# Patient Record
Sex: MALE | Race: WHITE
[De-identification: names, ages, dates, MRNs, and addresses within clinical notes are randomized per-mention and may not be internally consistent; named-entity substitution may affect disease eponyms.]

---

## 2018-11-13 ENCOUNTER — HOSPITAL ENCOUNTER (EMERGENCY)
Dept: HOSPITAL 47 - EC | Age: 28
Discharge: HOME | End: 2018-11-13
Payer: COMMERCIAL

## 2018-11-13 VITALS
HEART RATE: 77 BPM | RESPIRATION RATE: 18 BRPM | TEMPERATURE: 97.3 F | DIASTOLIC BLOOD PRESSURE: 73 MMHG | SYSTOLIC BLOOD PRESSURE: 139 MMHG

## 2018-11-13 DIAGNOSIS — N20.2: Primary | ICD-10-CM

## 2018-11-13 DIAGNOSIS — Z87.891: ICD-10-CM

## 2018-11-13 LAB
ALBUMIN SERPL-MCNC: 4.5 G/DL (ref 3.5–5)
ALP SERPL-CCNC: 67 U/L (ref 38–126)
ALT SERPL-CCNC: 39 U/L (ref 21–72)
ANION GAP SERPL CALC-SCNC: 11 MMOL/L
AST SERPL-CCNC: 35 U/L (ref 17–59)
BASOPHILS # BLD AUTO: 0 K/UL (ref 0–0.2)
BASOPHILS NFR BLD AUTO: 0 %
BUN SERPL-SCNC: 13 MG/DL (ref 9–20)
CALCIUM SPEC-MCNC: 9.5 MG/DL (ref 8.4–10.2)
CHLORIDE SERPL-SCNC: 105 MMOL/L (ref 98–107)
CO2 SERPL-SCNC: 24 MMOL/L (ref 22–30)
EOSINOPHIL # BLD AUTO: 0.1 K/UL (ref 0–0.7)
EOSINOPHIL NFR BLD AUTO: 1 %
ERYTHROCYTE [DISTWIDTH] IN BLOOD BY AUTOMATED COUNT: 5.42 M/UL (ref 4.3–5.9)
ERYTHROCYTE [DISTWIDTH] IN BLOOD: 13 % (ref 11.5–15.5)
GLUCOSE SERPL-MCNC: 92 MG/DL (ref 74–99)
HCT VFR BLD AUTO: 46.1 % (ref 39–53)
HGB BLD-MCNC: 15.5 GM/DL (ref 13–17.5)
LYMPHOCYTES # SPEC AUTO: 1.2 K/UL (ref 1–4.8)
LYMPHOCYTES NFR SPEC AUTO: 7 %
MCH RBC QN AUTO: 28.5 PG (ref 25–35)
MCHC RBC AUTO-ENTMCNC: 33.5 G/DL (ref 31–37)
MCV RBC AUTO: 85.1 FL (ref 80–100)
MONOCYTES # BLD AUTO: 0.8 K/UL (ref 0–1)
MONOCYTES NFR BLD AUTO: 5 %
NEUTROPHILS # BLD AUTO: 14.1 K/UL (ref 1.3–7.7)
NEUTROPHILS NFR BLD AUTO: 86 %
PH UR: 5.5 [PH] (ref 5–8)
PLATELET # BLD AUTO: 222 K/UL (ref 150–450)
POTASSIUM SERPL-SCNC: 4.6 MMOL/L (ref 3.5–5.1)
PROT SERPL-MCNC: 8 G/DL (ref 6.3–8.2)
RBC UR QL: >182 /HPF (ref 0–5)
SODIUM SERPL-SCNC: 140 MMOL/L (ref 137–145)
SP GR UR: 1.02 (ref 1–1.03)
UROBILINOGEN UR QL STRIP: <2 MG/DL (ref ?–2)
WBC # BLD AUTO: 16.4 K/UL (ref 3.8–10.6)
WBC #/AREA URNS HPF: 2 /HPF (ref 0–5)

## 2018-11-13 PROCEDURE — 36415 COLL VENOUS BLD VENIPUNCTURE: CPT

## 2018-11-13 PROCEDURE — 74018 RADEX ABDOMEN 1 VIEW: CPT

## 2018-11-13 PROCEDURE — 96374 THER/PROPH/DIAG INJ IV PUSH: CPT

## 2018-11-13 PROCEDURE — 85025 COMPLETE CBC W/AUTO DIFF WBC: CPT

## 2018-11-13 PROCEDURE — 80053 COMPREHEN METABOLIC PANEL: CPT

## 2018-11-13 PROCEDURE — 81001 URINALYSIS AUTO W/SCOPE: CPT

## 2018-11-13 PROCEDURE — 74176 CT ABD & PELVIS W/O CONTRAST: CPT

## 2018-11-13 PROCEDURE — 99284 EMERGENCY DEPT VISIT MOD MDM: CPT

## 2018-11-13 NOTE — XR
Abdomen

 

HISTORY: Right flank pain

 

Frontal view of the abdomen submitted on 2 images, comparison to prior CT same dated earlier time

 

Lung bases are clear. There is a spinal curvature. Calcification seen on CT within the right kidney a
nd proximal right ureter are not well seen possibly due to superimposed bowel gas. No evident bowel o
bstruction or pneumoperitoneum.

 

IMPRESSION: Right renal calcifications, proximal right ureteral calcification obscured by overlying b
owel gas.
spouse/family

## 2018-11-13 NOTE — ED
General Adult HPI





- General


Chief complaint: Back Pain/Injury


Stated complaint: flank pain


Time Seen by Provider: 11/13/18 13:20


Source: patient


Mode of arrival: ambulatory


Limitations: no limitations





- History of Present Illness


Initial comments: 


28-year-old male past medical history of previous renal calculi 8 years ago 

presenting today for chief complaint of right flank pain rating towards right 

groin.  Patient states it feels identical to when she's had stones in the past.

  He states the Toradol relieved the pain in the past.  Patient denies any 

recent urology follow-up.  Patient denies any hematuria, dysuria, urgency or 

frequency, chest pain, shortness of breath, fever, chills, recent trauma or 

fall to the back, abdominal pain, diarrhea, constipation.  Patient does admit 

to nausea and vomiting he states he feels is due to the pain.  Patient states 

this pain has been ongoing for 2 days, patient was evaluated by his primary 

care provider where he is told he had a muscle strain and discharged with a 

NSAID.  Aminophylline was negative.  Upon arrival patient is afebrile, he 

appears well, nontoxic.  He does not appear to be any acute distress he is 

sitting comfortably in chair.








- Related Data


 Previous Rx's











 Medication  Instructions  Recorded


 


Ketorolac [Toradol] 10 mg PO Q6HR 4 Days #15 tab 11/13/18


 


Tamsulosin HCl [Flomax] 0.4 mg PO DAILY 10 Days #10 capsule 11/13/18











 Allergies











Allergy/AdvReac Type Severity Reaction Status Date / Time


 


No Known Allergies Allergy   Verified 11/13/18 11:42














Review of Systems


ROS Statement: 


Those systems with pertinent positive or pertinent negative responses have been 

documented in the HPI.





ROS Other: All systems not noted in ROS Statement are negative.


Constitutional: Denies: fever, chills


ENT: Denies: ear pain, throat pain


Respiratory: Denies: cough, dyspnea, wheezes, hemoptysis, stridor


Cardiovascular: Denies: chest pain, palpitations


Endocrine: Denies: as per HPI, fatigue


Gastrointestinal: Denies: abdominal pain, nausea, vomiting, diarrhea, 

constipation, hematemesis, melena


Genitourinary: Denies: urgency, frequency, hematuria


Musculoskeletal: Reports: as per HPI, back pain


Skin: Denies: rash, lesions


Neurological: Denies: headache, weakness, numbness, paresthesias, confusion





Past Medical History


Additional Past Medical History / Comment(s): kidney stones


History of Any Multi-Drug Resistant Organisms: None Reported


Past Surgical History: Orthopedic Surgery, Tonsillectomy


Past Psychological History: No Psychological Hx Reported


Smoking Status: Former smoker


Past Alcohol Use History: None Reported


Past Drug Use History: None Reported





General Exam





- General Exam Comments


Initial Comments: 


General:  The patient is awake and alert, in no distress, and does not appear 

acutely ill. 


Eye:  Pupils are equal, round and reactive to light, extra-ocular movements are 

intact.  No nystagmus.  There is normal conjunctiva bilaterally.  No signs of 

icterus.  


Ears, nose, mouth and throat:  There are moist mucous membranes and no oral 

lesions. 


Neck:  The neck is supple, there is no tenderness or JVD.  


Cardiovascular:  There is a regular rate and rhythm. No murmur, rub or gallop 

is appreciated.


Respiratory:  Lungs are clear to auscultation, respirations are non-labored, 

breath sounds are equal.  No wheezes, stridor, rales, or rhonchi.


Gastrointestinal:  Soft, non-distended, non-tender abdomen without masses or 

organomegaly noted. There is no rebound or guarding present.  No CVA 

tenderness. Bowel sounds are unremarkable.


Musculoskeletal:  Normal ROM, no tenderness.  Strength 5/5. Sensation intact. 

Pulses equal bilaterally 2+.  


Neurological:  A&O x 3. CN II-XII intact, There are no obvious motor or sensory 

deficits. Coordination appears grossly intact. Speech is normal.


Skin:  Skin is warm and dry and no rashes or lesions are noted. 


Psychiatric:  Cooperative, appropriate mood & affect, normal judgment.  





Limitations: no limitations





Course


 Vital Signs











  11/13/18 11/13/18





  11:41 16:14


 


Temperature 97.7 F 97.3 F L


 


Pulse Rate 70 77


 


Respiratory 20 18





Rate  


 


Blood Pressure 123/79 139/73


 


O2 Sat by Pulse 99 98





Oximetry  














Medical Decision Making





- Medical Decision Making


KUB revealed right renal calcifications.  CT abdomen and pelvis without 

contrast revealed a 4 mm obstructing right proximal right ureteral calculus 

just distal to the right ureteropelvic junction.  Additionally bilateral 

nonobstructing renal calculi are seen.  Hematuria on urinalysis.  No nitrates.  

Patient afebrile.  Patient is tolerating by mouth intake, he also stated that 

symptoms almost completely resolved with Toradol.  Case discussed with Dr. Oneal who states the patient is stable for discharge with urology follow-up 

with prescription for Toradol for pain management as well as Flomax.  Patient 

agreed with plan, findings discussed with patient.  Return parameters discussed 

with patient, he verbalizes understanding.  Patient is happy and agreeable 

discharge.  Patient is discharged in stable condition with urology follow-up.








- Lab Data


Result diagrams: 


 11/13/18 13:58





 11/13/18 13:58


 Lab Results











  11/13/18 11/13/18 11/13/18 Range/Units





  13:58 13:58 13:58 


 


WBC  16.4 H    (3.8-10.6)  k/uL


 


RBC  5.42    (4.30-5.90)  m/uL


 


Hgb  15.5    (13.0-17.5)  gm/dL


 


Hct  46.1    (39.0-53.0)  %


 


MCV  85.1    (80.0-100.0)  fL


 


MCH  28.5    (25.0-35.0)  pg


 


MCHC  33.5    (31.0-37.0)  g/dL


 


RDW  13.0    (11.5-15.5)  %


 


Plt Count  222    (150-450)  k/uL


 


Neutrophils %  86    %


 


Lymphocytes %  7    %


 


Monocytes %  5    %


 


Eosinophils %  1    %


 


Basophils %  0    %


 


Neutrophils #  14.1 H    (1.3-7.7)  k/uL


 


Lymphocytes #  1.2    (1.0-4.8)  k/uL


 


Monocytes #  0.8    (0-1.0)  k/uL


 


Eosinophils #  0.1    (0-0.7)  k/uL


 


Basophils #  0.0    (0-0.2)  k/uL


 


Sodium   140   (137-145)  mmol/L


 


Potassium   4.6   (3.5-5.1)  mmol/L


 


Chloride   105   ()  mmol/L


 


Carbon Dioxide   24   (22-30)  mmol/L


 


Anion Gap   11   mmol/L


 


BUN   13   (9-20)  mg/dL


 


Creatinine   0.72   (0.66-1.25)  mg/dL


 


Est GFR (CKD-EPI)AfAm   >90   (>60 ml/min/1.73 sqM)  


 


Est GFR (CKD-EPI)NonAf   >90   (>60 ml/min/1.73 sqM)  


 


Glucose   92   (74-99)  mg/dL


 


Calcium   9.5   (8.4-10.2)  mg/dL


 


Total Bilirubin   0.8   (0.2-1.3)  mg/dL


 


AST   35   (17-59)  U/L


 


ALT   39   (21-72)  U/L


 


Alkaline Phosphatase   67   ()  U/L


 


Total Protein   8.0   (6.3-8.2)  g/dL


 


Albumin   4.5   (3.5-5.0)  g/dL


 


Urine Color    Yellow  


 


Urine Appearance    Clear  (Clear)  


 


Urine pH    5.5  (5.0-8.0)  


 


Ur Specific Gravity    1.017  (1.001-1.035)  


 


Urine Protein    Trace H  (Negative)  


 


Urine Glucose (UA)    Negative  (Negative)  


 


Urine Ketones    Negative  (Negative)  


 


Urine Blood    Moderate H  (Negative)  


 


Urine Nitrite    Negative  (Negative)  


 


Urine Bilirubin    Negative  (Negative)  


 


Urine Urobilinogen    <2.0  (<2.0)  mg/dL


 


Ur Leukocyte Esterase    Negative  (Negative)  


 


Urine RBC    >182 H  (0-5)  /hpf


 


Urine WBC    2  (0-5)  /hpf


 


Urine Bacteria    Occasional H  (None)  /hpf


 


Urine Mucus    Moderate H  (None)  /hpf














Disposition


Clinical Impression: 


 Kidney stones, Calculus of both kidneys, Right kidney stone





Disposition: HOME SELF-CARE


Condition: Good


Instructions:  Kidney Stones (ED)


Additional Instructions: 


Please use medication as discussed.  Please follow-up with urology in next 1-2 

days.  Please return to emergency room if the symptoms increase or worsen or 

for any other concerns, including fever, uncontrolled pain/nausea/vomiting.


Prescriptions: 


Ketorolac [Toradol] 10 mg PO Q6HR 4 Days #15 tab


Tamsulosin HCl [Flomax] 0.4 mg PO DAILY 10 Days #10 capsule


Is patient prescribed a controlled substance at d/c from ED?: No


Referrals: 


Lise Andrews MD [Primary Care Provider] - 1-2 days


Fabian Russell MD [STAFF PHYSICIAN] - 1-2 days


Time of Disposition: 15:49

## 2018-11-13 NOTE — CT
EXAMINATION TYPE: CT abdomen pelvis wo con

 

DATE OF EXAM: 11/13/2018

 

COMPARISON: Back pain with history of nephrolithiasis

 

HISTORY: Back pain, rule out stone

 

CT DLP: 1449 mGycm

Automated exposure control for dose reduction was used.

 

TECHNIQUE:  Helical acquisition of images was performed from the lung bases through the pelvis.

 

FINDINGS: 

 

LUNG BASES: No significant abnormality is appreciated.

 

LIVER/GB: No significant abnormality is appreciated.

 

PANCREAS: No significant abnormality is seen.

 

SPLEEN: No significant abnormality is seen.

 

ADRENALS: No significant abnormality is seen.

 

KIDNEYS: There is an obstructing right proximal ureteral 4 mm calculus just distal to the right urete
ropelvic Junction creating mild right-sided hydronephrosis. There are 3 additional right renal calcul
i 2 of which are 1 to 2 mm and the third measures 4 mm. On the left there are 2 1 to 2 mm upper pole 
nonobstructing renal calculi.

 

REPRODUCTIVE ORGANS: No significant abnormality is seen

 

URINARY BLADDER:  Incompletely distended and incompletely evaluated.

 

ADENOPATHY:  No greater than 1 cm short axis lymph nodes are noted within the abdomen or pelvis.

 

OSSEOUS STRUCTURES:  Few probable punctate bone islands are seen within the right femoral head. Multi
level mild degenerative changes of the visualized spine are present. Posterior osteophyte projects fr
om the superior endplate of L1 and inferior endplate of T12 creating at least mild spinal canal steno
sis at this level. There is very mild downsloping of the anterior vertebral body of T12 relating to a
 very mild compression deformity. This is age indeterminate.

 

BOWEL:  No dilated large or small bowel is seen. Appendix is within normal limits of size.

 

OTHER: There is a very small fat filled periumbilical hernia.

 

IMPRESSION: 

4 MM OBSTRUCTING RIGHT PROXIMAL URETERAL CALCULUS JUST DISTAL TO THE RIGHT URETEROPELVIC JUNCTION. AD
DITIONAL BILATERAL NONOBSTRUCTING RENAL CALCULI ARE SEEN. ADDITIONALLY THERE IS AN AGE-INDETERMINATE 
MILD COMPRESSION DEFORMITY AT T12 AND AT LEAST MILD SPINAL CANAL STENOSIS AT T12-L1. CORRELATE WITH P
OINT TENDERNESS.